# Patient Record
Sex: FEMALE | Race: WHITE | NOT HISPANIC OR LATINO | Employment: UNEMPLOYED | URBAN - METROPOLITAN AREA
[De-identification: names, ages, dates, MRNs, and addresses within clinical notes are randomized per-mention and may not be internally consistent; named-entity substitution may affect disease eponyms.]

---

## 2024-03-26 LAB
C TRACH RRNA SPEC QL PROBE: NEGATIVE
N GONORRHOEAE, AMPLIFIED DNA: NEGATIVE

## 2024-04-10 LAB — ABO + RH BLD: NORMAL

## 2024-04-18 LAB
HBV SURFACE AG SERPL QL IA: NEGATIVE
HCV AB SERPL QL IA: NEGATIVE
HCV AB SERPL QL IA: NORMAL
HIV 1+2 AB+HIV1 P24 AG SERPL QL IA: NEGATIVE
RPR: NON REACTIVE
RUBELLA IMMUNE STATUS: NORMAL

## 2024-08-24 LAB — GLUCOSE SERPL-MCNC: 133 MG/DL

## 2024-11-10 LAB — PRENATAL STREP B CULTURE: POSITIVE

## 2024-11-28 ENCOUNTER — HOSPITAL ENCOUNTER (INPATIENT)
Facility: HOSPITAL | Age: 24
LOS: 2 days | Discharge: HOME | End: 2024-11-30
Attending: SPECIALIST | Admitting: OBSTETRICS & GYNECOLOGY
Payer: MEDICAID

## 2024-11-28 DIAGNOSIS — Z3A.38 38 WEEKS GESTATION OF PREGNANCY: ICD-10-CM

## 2024-11-28 DIAGNOSIS — O42.90 AMNIOTIC FLUID LEAKING: ICD-10-CM

## 2024-11-28 LAB
AMPHET+METHAMPHET UR QL: NEGATIVE
BACTERIA #/AREA URNS HPF: ABNORMAL /HPF
BARBITURATES UR QL SCN>200 NG/ML: NEGATIVE
BASOPHILS # BLD AUTO: 0.02 K/UL (ref 0–0.2)
BASOPHILS NFR BLD: 0.2 % (ref 0–1.9)
BENZODIAZ UR QL SCN>200 NG/ML: NEGATIVE
BILIRUB UR QL STRIP: NEGATIVE
BUPRENORPHINE UR QL: NEGATIVE
BZE UR QL SCN: NEGATIVE
CANNABINOIDS UR QL SCN: NEGATIVE
CLARITY UR: CLEAR
COLOR UR: YELLOW
CREAT UR-MCNC: 25.7 MG/DL (ref 15–325)
DIFFERENTIAL METHOD BLD: ABNORMAL
EOSINOPHIL # BLD AUTO: 0.1 K/UL (ref 0–0.5)
EOSINOPHIL NFR BLD: 0.5 % (ref 0–8)
ERYTHROCYTE [DISTWIDTH] IN BLOOD BY AUTOMATED COUNT: 15.5 % (ref 11.5–14.5)
FENTANYL UR QL SCN: NORMAL
GLUCOSE UR QL STRIP: ABNORMAL
HCT VFR BLD AUTO: 34.4 % (ref 37–48.5)
HGB BLD-MCNC: 11.7 G/DL (ref 12–16)
HGB UR QL STRIP: ABNORMAL
IMM GRANULOCYTES # BLD AUTO: 0.05 K/UL (ref 0–0.04)
IMM GRANULOCYTES NFR BLD AUTO: 0.5 % (ref 0–0.5)
KETONES UR QL STRIP: NEGATIVE
LEUKOCYTE ESTERASE UR QL STRIP: NEGATIVE
LYMPHOCYTES # BLD AUTO: 1.9 K/UL (ref 1–4.8)
LYMPHOCYTES NFR BLD: 18.8 % (ref 18–48)
MCH RBC QN AUTO: 30.8 PG (ref 27–31)
MCHC RBC AUTO-ENTMCNC: 34 G/DL (ref 32–36)
MCV RBC AUTO: 91 FL (ref 82–98)
MICROSCOPIC COMMENT: ABNORMAL
MONOCYTES # BLD AUTO: 1.2 K/UL (ref 0.3–1)
MONOCYTES NFR BLD: 12.1 % (ref 4–15)
NEUTROPHILS # BLD AUTO: 6.8 K/UL (ref 1.8–7.7)
NEUTROPHILS NFR BLD: 67.9 % (ref 38–73)
NITRITE UR QL STRIP: NEGATIVE
NRBC BLD-RTO: 0 /100 WBC
OPIATES UR QL SCN: NEGATIVE
PCP UR QL SCN>25 NG/ML: NEGATIVE
PH UR STRIP: 7 [PH] (ref 5–8)
PLATELET # BLD AUTO: 173 K/UL (ref 150–450)
PMV BLD AUTO: 9.7 FL (ref 9.2–12.9)
PROT UR QL STRIP: NEGATIVE
RBC # BLD AUTO: 3.8 M/UL (ref 4–5.4)
RBC #/AREA URNS HPF: 6 /HPF (ref 0–4)
SP GR UR STRIP: 1.01 (ref 1–1.03)
SQUAMOUS #/AREA URNS HPF: 4 /HPF
TOXICOLOGY INFORMATION: NORMAL
URN SPEC COLLECT METH UR: ABNORMAL
UROBILINOGEN UR STRIP-ACNC: NEGATIVE EU/DL
WBC # BLD AUTO: 9.94 K/UL (ref 3.9–12.7)
WBC #/AREA URNS HPF: 3 /HPF (ref 0–5)

## 2024-11-28 PROCEDURE — 80307 DRUG TEST PRSMV CHEM ANLYZR: CPT | Performed by: OBSTETRICS & GYNECOLOGY

## 2024-11-28 PROCEDURE — 80307 DRUG TEST PRSMV CHEM ANLYZR: CPT | Mod: 91 | Performed by: OBSTETRICS & GYNECOLOGY

## 2024-11-28 PROCEDURE — 81001 URINALYSIS AUTO W/SCOPE: CPT | Performed by: OBSTETRICS & GYNECOLOGY

## 2024-11-28 PROCEDURE — 25000003 PHARM REV CODE 250: Performed by: OBSTETRICS & GYNECOLOGY

## 2024-11-28 PROCEDURE — 86593 SYPHILIS TEST NON-TREP QUANT: CPT | Performed by: OBSTETRICS & GYNECOLOGY

## 2024-11-28 PROCEDURE — 72100002 HC LABOR CARE, 1ST 8 HOURS

## 2024-11-28 PROCEDURE — 12000002 HC ACUTE/MED SURGE SEMI-PRIVATE ROOM

## 2024-11-28 PROCEDURE — 86900 BLOOD TYPING SEROLOGIC ABO: CPT | Performed by: OBSTETRICS & GYNECOLOGY

## 2024-11-28 PROCEDURE — 85025 COMPLETE CBC W/AUTO DIFF WBC: CPT | Performed by: OBSTETRICS & GYNECOLOGY

## 2024-11-28 PROCEDURE — 63600175 PHARM REV CODE 636 W HCPCS: Performed by: OBSTETRICS & GYNECOLOGY

## 2024-11-28 RX ORDER — MISOPROSTOL 200 UG/1
800 TABLET ORAL ONCE AS NEEDED
Status: DISCONTINUED | OUTPATIENT
Start: 2024-11-28 | End: 2024-11-29

## 2024-11-28 RX ORDER — CEFAZOLIN SODIUM 1 G/50ML
1 SOLUTION INTRAVENOUS
Status: DISCONTINUED | OUTPATIENT
Start: 2024-11-29 | End: 2024-11-29

## 2024-11-28 RX ORDER — NALBUPHINE HYDROCHLORIDE 10 MG/ML
5 INJECTION INTRAMUSCULAR; INTRAVENOUS; SUBCUTANEOUS
Status: DISCONTINUED | OUTPATIENT
Start: 2024-11-28 | End: 2024-11-29

## 2024-11-28 RX ORDER — LIDOCAINE HYDROCHLORIDE 10 MG/ML
10 INJECTION, SOLUTION INFILTRATION; PERINEURAL ONCE AS NEEDED
Status: DISCONTINUED | OUTPATIENT
Start: 2024-11-28 | End: 2024-11-29

## 2024-11-28 RX ORDER — FENTANYL/BUPIVACAINE/NS/PF 2MCG/ML-.1
PLASTIC BAG, INJECTION (ML) INJECTION CONTINUOUS
Status: DISCONTINUED | OUTPATIENT
Start: 2024-11-28 | End: 2024-11-29

## 2024-11-28 RX ORDER — CARBOPROST TROMETHAMINE 250 UG/ML
250 INJECTION, SOLUTION INTRAMUSCULAR
Status: DISCONTINUED | OUTPATIENT
Start: 2024-11-28 | End: 2024-11-29

## 2024-11-28 RX ORDER — OXYTOCIN-SODIUM CHLORIDE 0.9% IV SOLN 30 UNIT/500ML 30-0.9/5 UT/ML-%
0-30 SOLUTION INTRAVENOUS CONTINUOUS
Status: DISCONTINUED | OUTPATIENT
Start: 2024-11-28 | End: 2024-11-29

## 2024-11-28 RX ORDER — EPHEDRINE SULFATE 50 MG/ML
10 INJECTION, SOLUTION INTRAVENOUS ONCE AS NEEDED
Status: DISCONTINUED | OUTPATIENT
Start: 2024-11-28 | End: 2024-11-29

## 2024-11-28 RX ORDER — ONDANSETRON HYDROCHLORIDE 2 MG/ML
4 INJECTION, SOLUTION INTRAVENOUS EVERY 6 HOURS PRN
Status: DISCONTINUED | OUTPATIENT
Start: 2024-11-28 | End: 2024-11-29

## 2024-11-28 RX ORDER — OXYTOCIN-SODIUM CHLORIDE 0.9% IV SOLN 30 UNIT/500ML 30-0.9/5 UT/ML-%
10 SOLUTION INTRAVENOUS ONCE AS NEEDED
Status: DISCONTINUED | OUTPATIENT
Start: 2024-11-28 | End: 2024-11-29

## 2024-11-28 RX ORDER — DIPHENOXYLATE HYDROCHLORIDE AND ATROPINE SULFATE 2.5; .025 MG/1; MG/1
2 TABLET ORAL EVERY 6 HOURS PRN
Status: DISCONTINUED | OUTPATIENT
Start: 2024-11-28 | End: 2024-11-29

## 2024-11-28 RX ORDER — METHYLERGONOVINE MALEATE 0.2 MG/ML
200 INJECTION INTRAVENOUS ONCE AS NEEDED
Status: DISCONTINUED | OUTPATIENT
Start: 2024-11-28 | End: 2024-11-29

## 2024-11-28 RX ORDER — OXYTOCIN 10 [USP'U]/ML
10 INJECTION, SOLUTION INTRAMUSCULAR; INTRAVENOUS ONCE AS NEEDED
Status: DISCONTINUED | OUTPATIENT
Start: 2024-11-28 | End: 2024-11-29

## 2024-11-28 RX ORDER — OXYTOCIN-SODIUM CHLORIDE 0.9% IV SOLN 30 UNIT/500ML 30-0.9/5 UT/ML-%
95 SOLUTION INTRAVENOUS ONCE AS NEEDED
Status: DISCONTINUED | OUTPATIENT
Start: 2024-11-28 | End: 2024-11-29

## 2024-11-28 RX ORDER — CEFAZOLIN SODIUM 2 G/50ML
2 SOLUTION INTRAVENOUS ONCE
Status: COMPLETED | OUTPATIENT
Start: 2024-11-28 | End: 2024-11-28

## 2024-11-28 RX ORDER — SODIUM CHLORIDE, SODIUM LACTATE, POTASSIUM CHLORIDE, CALCIUM CHLORIDE 600; 310; 30; 20 MG/100ML; MG/100ML; MG/100ML; MG/100ML
INJECTION, SOLUTION INTRAVENOUS CONTINUOUS
Status: DISCONTINUED | OUTPATIENT
Start: 2024-11-28 | End: 2024-11-29

## 2024-11-28 RX ORDER — TRANEXAMIC ACID 10 MG/ML
1000 INJECTION, SOLUTION INTRAVENOUS EVERY 30 MIN PRN
Status: DISCONTINUED | OUTPATIENT
Start: 2024-11-28 | End: 2024-11-29

## 2024-11-28 RX ORDER — CALCIUM CARBONATE 200(500)MG
500 TABLET,CHEWABLE ORAL 3 TIMES DAILY PRN
Status: DISCONTINUED | OUTPATIENT
Start: 2024-11-28 | End: 2024-11-29

## 2024-11-28 RX ORDER — OXYTOCIN-SODIUM CHLORIDE 0.9% IV SOLN 30 UNIT/500ML 30-0.9/5 UT/ML-%
95 SOLUTION INTRAVENOUS CONTINUOUS PRN
Status: DISCONTINUED | OUTPATIENT
Start: 2024-11-28 | End: 2024-11-29

## 2024-11-28 RX ORDER — NALBUPHINE HYDROCHLORIDE 10 MG/ML
10 INJECTION INTRAMUSCULAR; INTRAVENOUS; SUBCUTANEOUS
Status: DISCONTINUED | OUTPATIENT
Start: 2024-11-28 | End: 2024-11-29

## 2024-11-28 RX ORDER — ROPIVACAINE HYDROCHLORIDE 2 MG/ML
20 INJECTION, SOLUTION EPIDURAL; INFILTRATION ONCE AS NEEDED
Status: COMPLETED | OUTPATIENT
Start: 2024-11-28 | End: 2024-11-29

## 2024-11-28 RX ADMIN — CEFAZOLIN SODIUM 2 G: 2 SOLUTION INTRAVENOUS at 10:11

## 2024-11-28 RX ADMIN — SODIUM CHLORIDE, POTASSIUM CHLORIDE, SODIUM LACTATE AND CALCIUM CHLORIDE: 600; 310; 30; 20 INJECTION, SOLUTION INTRAVENOUS at 10:11

## 2024-11-28 RX ADMIN — OXYTOCIN 2 MILLI-UNITS/MIN: 10 INJECTION INTRAVENOUS at 11:11

## 2024-11-29 ENCOUNTER — ANESTHESIA EVENT (OUTPATIENT)
Dept: OBSTETRICS AND GYNECOLOGY | Facility: HOSPITAL | Age: 24
End: 2024-11-29
Payer: MEDICAID

## 2024-11-29 ENCOUNTER — ANESTHESIA (OUTPATIENT)
Dept: OBSTETRICS AND GYNECOLOGY | Facility: HOSPITAL | Age: 24
End: 2024-11-29
Payer: COMMERCIAL

## 2024-11-29 PROBLEM — Z3A.38 38 WEEKS GESTATION OF PREGNANCY: Status: ACTIVE | Noted: 2024-11-29

## 2024-11-29 PROBLEM — O42.90 AMNIOTIC FLUID LEAKING: Status: ACTIVE | Noted: 2024-11-29

## 2024-11-29 LAB
ABO + RH BLD: NORMAL
BLD GP AB SCN CELLS X3 SERPL QL: NORMAL
TREPONEMA PALLIDUM IGG+IGM AB [PRESENCE] IN SERUM OR PLASMA BY IMMUNOASSAY: NONREACTIVE

## 2024-11-29 PROCEDURE — 27200710 HC EPIDURAL INFUSION PUMP SET: Performed by: ANESTHESIOLOGY

## 2024-11-29 PROCEDURE — 62326 NJX INTERLAMINAR LMBR/SAC: CPT | Performed by: ANESTHESIOLOGY

## 2024-11-29 PROCEDURE — 99900031 HC PATIENT EDUCATION (STAT)

## 2024-11-29 PROCEDURE — 25000003 PHARM REV CODE 250: Performed by: ANESTHESIOLOGY

## 2024-11-29 PROCEDURE — 94799 UNLISTED PULMONARY SVC/PX: CPT

## 2024-11-29 PROCEDURE — 63600175 PHARM REV CODE 636 W HCPCS: Performed by: ANESTHESIOLOGY

## 2024-11-29 PROCEDURE — 12000002 HC ACUTE/MED SURGE SEMI-PRIVATE ROOM

## 2024-11-29 PROCEDURE — C1751 CATH, INF, PER/CENT/MIDLINE: HCPCS | Performed by: ANESTHESIOLOGY

## 2024-11-29 PROCEDURE — 36415 COLL VENOUS BLD VENIPUNCTURE: CPT | Performed by: OBSTETRICS & GYNECOLOGY

## 2024-11-29 PROCEDURE — 99900035 HC TECH TIME PER 15 MIN (STAT)

## 2024-11-29 PROCEDURE — 51702 INSERT TEMP BLADDER CATH: CPT

## 2024-11-29 PROCEDURE — 25000003 PHARM REV CODE 250: Performed by: OBSTETRICS & GYNECOLOGY

## 2024-11-29 PROCEDURE — 59409 OBSTETRICAL CARE: CPT | Mod: AA,,, | Performed by: ANESTHESIOLOGY

## 2024-11-29 PROCEDURE — 25000242 PHARM REV CODE 250 ALT 637 W/ HCPCS: Performed by: OBSTETRICS & GYNECOLOGY

## 2024-11-29 PROCEDURE — 72200005 HC VAGINAL DELIVERY LEVEL II

## 2024-11-29 PROCEDURE — 94761 N-INVAS EAR/PLS OXIMETRY MLT: CPT

## 2024-11-29 RX ORDER — DIPHENHYDRAMINE HCL 25 MG
25 CAPSULE ORAL EVERY 4 HOURS PRN
Status: DISCONTINUED | OUTPATIENT
Start: 2024-11-29 | End: 2024-11-30 | Stop reason: HOSPADM

## 2024-11-29 RX ORDER — ONDANSETRON HYDROCHLORIDE 2 MG/ML
4 INJECTION, SOLUTION INTRAVENOUS EVERY 6 HOURS PRN
Status: DISCONTINUED | OUTPATIENT
Start: 2024-11-29 | End: 2024-11-29

## 2024-11-29 RX ORDER — OXYTOCIN-SODIUM CHLORIDE 0.9% IV SOLN 30 UNIT/500ML 30-0.9/5 UT/ML-%
95 SOLUTION INTRAVENOUS ONCE AS NEEDED
Status: DISCONTINUED | OUTPATIENT
Start: 2024-11-29 | End: 2024-11-30 | Stop reason: HOSPADM

## 2024-11-29 RX ORDER — OXYTOCIN-SODIUM CHLORIDE 0.9% IV SOLN 30 UNIT/500ML 30-0.9/5 UT/ML-%
30 SOLUTION INTRAVENOUS ONCE AS NEEDED
Status: DISCONTINUED | OUTPATIENT
Start: 2024-11-29 | End: 2024-11-30 | Stop reason: HOSPADM

## 2024-11-29 RX ORDER — OXYTOCIN 10 [USP'U]/ML
10 INJECTION, SOLUTION INTRAMUSCULAR; INTRAVENOUS ONCE AS NEEDED
Status: DISCONTINUED | OUTPATIENT
Start: 2024-11-29 | End: 2024-11-30 | Stop reason: HOSPADM

## 2024-11-29 RX ORDER — SODIUM CHLORIDE 0.9 % (FLUSH) 0.9 %
10 SYRINGE (ML) INJECTION
Status: DISCONTINUED | OUTPATIENT
Start: 2024-11-29 | End: 2024-11-30 | Stop reason: HOSPADM

## 2024-11-29 RX ORDER — NALOXONE HCL 0.4 MG/ML
0.4 VIAL (ML) INJECTION SEE ADMIN INSTRUCTIONS
Status: DISCONTINUED | OUTPATIENT
Start: 2024-11-29 | End: 2024-11-29

## 2024-11-29 RX ORDER — FENTANYL/BUPIVACAINE/NS/PF 2MCG/ML-.1
14 PLASTIC BAG, INJECTION (ML) INJECTION CONTINUOUS
Status: DISCONTINUED | OUTPATIENT
Start: 2024-11-29 | End: 2024-11-29

## 2024-11-29 RX ORDER — HYDROCODONE BITARTRATE AND ACETAMINOPHEN 10; 325 MG/1; MG/1
1 TABLET ORAL EVERY 4 HOURS PRN
Status: DISCONTINUED | OUTPATIENT
Start: 2024-11-29 | End: 2024-11-30 | Stop reason: HOSPADM

## 2024-11-29 RX ORDER — METHYLERGONOVINE MALEATE 0.2 MG/ML
200 INJECTION INTRAVENOUS ONCE AS NEEDED
Status: DISCONTINUED | OUTPATIENT
Start: 2024-11-29 | End: 2024-11-30 | Stop reason: HOSPADM

## 2024-11-29 RX ORDER — TRANEXAMIC ACID 10 MG/ML
1000 INJECTION, SOLUTION INTRAVENOUS EVERY 30 MIN PRN
Status: DISCONTINUED | OUTPATIENT
Start: 2024-11-29 | End: 2024-11-30 | Stop reason: HOSPADM

## 2024-11-29 RX ORDER — DIPHENOXYLATE HYDROCHLORIDE AND ATROPINE SULFATE 2.5; .025 MG/1; MG/1
2 TABLET ORAL EVERY 6 HOURS PRN
Status: DISCONTINUED | OUTPATIENT
Start: 2024-11-29 | End: 2024-11-30 | Stop reason: HOSPADM

## 2024-11-29 RX ORDER — EPHEDRINE SULFATE 50 MG/ML
10 INJECTION, SOLUTION INTRAVENOUS ONCE AS NEEDED
Status: DISCONTINUED | OUTPATIENT
Start: 2024-11-29 | End: 2024-11-29

## 2024-11-29 RX ORDER — HYDROCODONE BITARTRATE AND ACETAMINOPHEN 5; 325 MG/1; MG/1
1 TABLET ORAL EVERY 4 HOURS PRN
Status: DISCONTINUED | OUTPATIENT
Start: 2024-11-29 | End: 2024-11-30 | Stop reason: HOSPADM

## 2024-11-29 RX ORDER — CARBOPROST TROMETHAMINE 250 UG/ML
250 INJECTION, SOLUTION INTRAMUSCULAR
Status: DISCONTINUED | OUTPATIENT
Start: 2024-11-29 | End: 2024-11-30 | Stop reason: HOSPADM

## 2024-11-29 RX ORDER — ALBUTEROL SULFATE 90 UG/1
1 INHALANT RESPIRATORY (INHALATION) EVERY 6 HOURS PRN
Status: DISCONTINUED | OUTPATIENT
Start: 2024-11-29 | End: 2024-11-29

## 2024-11-29 RX ORDER — DOCUSATE SODIUM 100 MG/1
200 CAPSULE, LIQUID FILLED ORAL 2 TIMES DAILY PRN
Status: DISCONTINUED | OUTPATIENT
Start: 2024-11-29 | End: 2024-11-30 | Stop reason: HOSPADM

## 2024-11-29 RX ORDER — OXYTOCIN-SODIUM CHLORIDE 0.9% IV SOLN 30 UNIT/500ML 30-0.9/5 UT/ML-%
95 SOLUTION INTRAVENOUS CONTINUOUS PRN
Status: DISCONTINUED | OUTPATIENT
Start: 2024-11-29 | End: 2024-11-30 | Stop reason: HOSPADM

## 2024-11-29 RX ORDER — MISOPROSTOL 200 UG/1
800 TABLET ORAL ONCE AS NEEDED
Status: DISCONTINUED | OUTPATIENT
Start: 2024-11-29 | End: 2024-11-30 | Stop reason: HOSPADM

## 2024-11-29 RX ORDER — ONDANSETRON 4 MG/1
8 TABLET, ORALLY DISINTEGRATING ORAL EVERY 8 HOURS PRN
Status: DISCONTINUED | OUTPATIENT
Start: 2024-11-29 | End: 2024-11-30 | Stop reason: HOSPADM

## 2024-11-29 RX ORDER — DIPHENHYDRAMINE HYDROCHLORIDE 50 MG/ML
12.5 INJECTION INTRAMUSCULAR; INTRAVENOUS EVERY 4 HOURS PRN
Status: DISCONTINUED | OUTPATIENT
Start: 2024-11-29 | End: 2024-11-29

## 2024-11-29 RX ORDER — ACETAMINOPHEN 325 MG/1
650 TABLET ORAL EVERY 6 HOURS PRN
Status: DISCONTINUED | OUTPATIENT
Start: 2024-11-29 | End: 2024-11-30 | Stop reason: HOSPADM

## 2024-11-29 RX ORDER — DIPHENHYDRAMINE HYDROCHLORIDE 50 MG/ML
25 INJECTION INTRAMUSCULAR; INTRAVENOUS EVERY 4 HOURS PRN
Status: DISCONTINUED | OUTPATIENT
Start: 2024-11-29 | End: 2024-11-30 | Stop reason: HOSPADM

## 2024-11-29 RX ORDER — AZELASTINE 1 MG/ML
1 SPRAY, METERED NASAL 2 TIMES DAILY PRN
Status: DISCONTINUED | OUTPATIENT
Start: 2024-11-29 | End: 2024-11-30 | Stop reason: HOSPADM

## 2024-11-29 RX ORDER — IBUPROFEN 400 MG/1
800 TABLET ORAL EVERY 6 HOURS PRN
Status: DISCONTINUED | OUTPATIENT
Start: 2024-11-29 | End: 2024-11-30 | Stop reason: HOSPADM

## 2024-11-29 RX ORDER — HYDROCORTISONE 25 MG/G
CREAM TOPICAL 3 TIMES DAILY PRN
Status: DISCONTINUED | OUTPATIENT
Start: 2024-11-29 | End: 2024-11-30 | Stop reason: HOSPADM

## 2024-11-29 RX ORDER — ROPIVACAINE HYDROCHLORIDE 2 MG/ML
INJECTION, SOLUTION EPIDURAL; INFILTRATION
Status: DISCONTINUED | OUTPATIENT
Start: 2024-11-29 | End: 2024-11-29

## 2024-11-29 RX ORDER — ALBUTEROL SULFATE 0.83 MG/ML
2.5 SOLUTION RESPIRATORY (INHALATION) EVERY 6 HOURS PRN
Status: DISCONTINUED | OUTPATIENT
Start: 2024-11-29 | End: 2024-11-30 | Stop reason: HOSPADM

## 2024-11-29 RX ADMIN — ROPIVACAINE HYDROCHLORIDE 4 ML: 2 INJECTION, SOLUTION EPIDURAL; INFILTRATION at 01:11

## 2024-11-29 RX ADMIN — HYDROCODONE BITARTRATE AND ACETAMINOPHEN 1 TABLET: 5; 325 TABLET ORAL at 11:11

## 2024-11-29 RX ADMIN — ROPIVACAINE HYDROCHLORIDE 20 ML: 2 INJECTION EPIDURAL; INFILTRATION; PERINEURAL at 01:11

## 2024-11-29 RX ADMIN — BENZOCAINE AND LEVOMENTHOL: 200; 5 SPRAY TOPICAL at 03:11

## 2024-11-29 RX ADMIN — Medication: at 11:11

## 2024-11-29 RX ADMIN — IBUPROFEN 800 MG: 400 TABLET, FILM COATED ORAL at 06:11

## 2024-11-29 RX ADMIN — Medication 14 ML/HR: at 01:11

## 2024-11-29 RX ADMIN — HYDROCODONE BITARTRATE AND ACETAMINOPHEN 1 TABLET: 5; 325 TABLET ORAL at 03:11

## 2024-11-29 RX ADMIN — IBUPROFEN 800 MG: 400 TABLET, FILM COATED ORAL at 10:11

## 2024-11-29 NOTE — PLAN OF CARE
Problem: Labor  Goal: Hemostasis  Outcome: Met  Goal: Stable Fetal Wellbeing  Outcome: Met  Goal: Effective Progression to Delivery  Outcome: Met  Goal: Absence of Infection Signs and Symptoms  Outcome: Met  Goal: Acceptable Pain Control  Outcome: Met  Goal: Normal Uterine Contraction Pattern  Outcome: Met     Pt delivered viable infant male vaginally.

## 2024-11-29 NOTE — LACTATION NOTE
This note was copied from a baby's chart.  Mom reports baby is breastfeeding well. Mom denies any questions/concerns or assistance @ this time. Assistance offered prn. Mom verbalized understanding

## 2024-11-29 NOTE — NURSING TRANSFER
Nursing Transfer Note      11/29/2024   12:57 PM    Nurse giving handoff: ROGER zhu  Nurse receiving handoff:Zeynep curiel     Reason patient is being transferred: post partum    Transfer To: 2100    Transfer via wheelchair    Transfer with pt belongings    Transported by ROGER Zhu     Transfer Vital Signs:  Blood Pressure:pending pp nurse charting   Heart Rate:-  O2:-  Temperature:-  Respirations:-    Telemetry: na   Order for Tele Monitor? No    Additional Lines: none     Medicines sent: none    Any special needs or follow-up needed: fundal checks     Patient belongings transferred with patient: Yes    Chart send with patient: Yes    Notified: spouse    Patient reassessed at: 1315 11/29/24 (date, time)  1  Upon arrival to floor: patient oriented to room, call bell in reach, and bed in lowest position

## 2024-11-29 NOTE — NURSING
[facility]  Department of Obstetrics and Gynecology  Labor & Delivery Triage Assessment    PATIENT NAME: Julianna Solomon  MRN: [id number]  TODAY'S DATE: 2024    CHIEF COMPLAINT: No chief complaint on file.      OB History    Para Term  AB Living   3 2 2 0 0 2   SAB IAB Ectopic Multiple Live Births   0 0 0 0 2      # Outcome Date GA Lbr Karlos/2nd Weight Sex Type Anes PTL Lv   3 Current            2 Term 19 40w2d   M Vag-Spont EPI  MAHESH   1 Term 12/27/15 39w0d  3.572 kg (7 lb 14 oz) F Vag-Spont EPI N MAHESH      Name: chandler     Past Medical History:   Diagnosis Date    Asthma      History reviewed. No pertinent surgical history.      VITAL SIGNS - ABNORMAL VITALS INCLUDE TEMP >100.4,RR <12 or >26, SUSTAINED MATERNAL PULSE <60 or >120     VITAL SIGNS (Most Recent)  Temp: 98.2 °F (36.8 °C) (24)  Pulse: (!) 113 (24)  Resp: 16 (24)  BP: 117/74 (24)  SpO2: 99 % (24)    VITAL SIGNS     normal  HEADACHE    no     VOMITING    no  VISUAL DISTURBANCES  no  EPIGASTRIC PAIN        no  PROTEINURIA 2+ or MORE             no   EDEMA FACE/EXTREMITIES            no    FETAL MOVEMENT     FETAL MOVEMENT: Active  FETAL HEART RATE BASELINE =  140  normal  FETAL HEART RATE VARIABILITY:  Moderate  FETAL HEART RATE ACCELERATIONS FOR GESTATIONAL AGE: present  FETAL HEART RATE DECELERATIONS: none    ABDOMINAL PAIN/CRAMPING/CONTRACTIONS     Patient is complaining of abdominal pain/cramping/contractions. Contraction pattern is regular, < or = 5 minutes apart. Contraction strength is mild. Resting tone is relaxed. Abdominal palpation is non-tender.    RUPTURE OF MEMBRANES OR LEAKING OF AMNIOTIC FLUID     Patient reports leaking of amniotic fluid and is clear in color and reporting amount as moderate.   ROM plus collected is Positive. GBS status is Positive.    VAGINAL BLEEDING     Patient denies vaginal bleeding.    VAGINAL EXAM     DILATION:  3  STATION:   -3  EFFACEMENT:  70      PAIN PRESENT ON ARRIVAL     ONSET:   Intermittent  LOCATION:  Abdomen  PAIN SCALE (0-10):  1  DESCRIPTION: tightening    Interventions     None.      PATIENT DISPOSITION     Admitted to Labor & Delivery      Dr. Flores notified at 2131 of the above assessment. Dr. Gannon to follow    Sil Joe, ANNALISA  [facility]  11/28/2024

## 2024-11-29 NOTE — PROGRESS NOTES
Automatic Inhaler to Nebulizer Interchange    albuterol (Ventolin, ProAir, or Proventil)  mcg given multiple times per day changed to albuterol 2.5 mg q6h prn wheezing or shortness of breath  per [facility] Automatic Therapeutic Substitutions Protocol.    Please contact pharmacy at extension [phone] with any questions.     Thank you,   Daniel Duenas

## 2024-11-29 NOTE — L&D DELIVERY NOTE
[facility]  Vaginal Delivery   Operative Note    SUMMARY     Normal spontaneous vaginal delivery of live infant, was placed on mothers abdomen for skin to skin and bulb suctioning performed.  Infant delivered position OA over intact perineum.  Nuchal cord: Yes, cord reduced at perineum.x2    Spontaneous delivery of placenta and IV pitocin given noting good uterine tone.  No lacerations noted.  Patient tolerated delivery well. Sponge needle and lap counted correctly x2.  EBL=100cc    Indications: Amniotic fluid leaking  Pregnancy complicated by:   Patient Active Problem List   Diagnosis    Amniotic fluid leaking    38 weeks gestation of pregnancy     Admitting GA: 38w5d    Delivery Information for Robert Solomon    Birth information:  YOB: 2025   Time of birth: 5:45 AM   Sex: male   Head Delivery Date/Time:     Delivery type:    Gestational Age: 38w5d       Delivery Providers    Delivering clinician:            Measurements    Weight:   Length:          Apgars    Living status:   Apgar Component Scores:  1 min.:  5 min.:  10 min.:  15 min.:  20 min.:    Skin color:         Heart rate:         Reflex irritability:         Muscle tone:         Respiratory effort:         Total:                                  Interventions/Resuscitation           Cord    No data filed       Placenta    Placenta delivery date/time:   Placenta removal:            Labor Events:       labor:       Labor Onset Date/Time:         Dilation Complete Date/Time:         Start Pushing Date/Time:         Start Pushing Date/Time:       Rupture Date/Time: 24        Rupture type: SRM (Spontaneous Rupture)        Fluid Amount:       Fluid Color: Clear              steroids:       Antibiotics given for GBS:       Induction:       Indications for induction:        Augmentation:       Indications for augmentation:       Labor complications:       Additional complications:          Cervical  ripening:                     Delivery:      Episiotomy:       Indication for Episiotomy:       Perineal Lacerations:   Repaired:      Periurethral Laceration:   Repaired:     Labial Laceration:   Repaired:     Sulcus Laceration:   Repaired:     Vaginal Laceration:   Repaired:     Cervical Laceration:   Repaired:     Repair suture:       Repair # of packets:       Last Value - EBL - Nursing (mL):       Sum - EBL - Nursing (mL): 0     Last Value - EBL - Anesthesia (mL):      Calculated QBL (mL):       Running total QBL (mL):       Vaginal Sweep Performed:       Surgicount Correct:         Other providers:            Details (if applicable):  Trial of Labor      Categorization:      Priority:     Indications for :     Incision Type:       Additional  information:  Forceps:    Vacuum:    Breech:    Observed anomalies    Other (Comments):

## 2024-11-29 NOTE — NURSING
Ongoing assessment, acceptable pain management, Continuous EFM and tocometry. Vaginal bleeding wnl, anticipate . Reassess pt needs prn

## 2024-11-29 NOTE — NURSING
[facility]  Department of OBGYN  OB Summary        PATIENT NAME: Julianna Solomon                                                                                                                                                                       AGE: 24 y.o.                                                                                          MRN: [id number]  PATIENT LOCATION:  [facility]  ADMIT DATE: 2024  TODAY'S DATE: 2024 Hospital Day: 2  MARIO: Estimated Date of Delivery: 24  GA: 38w5d     OB HISTORY:    OB History    Para Term  AB Living   3 3 3     3   SAB IAB Ectopic Multiple Live Births         0 3      # Outcome Date GA Lbr Karlos/2nd Weight Sex Type Anes PTL Lv   3 Term 24 38w5d 03:39 / 00:08  M Vag-Spont EPI N MAHESH   2 Term 19 40w2d   M Vag-Spont EPI  MAHESH   1 Term 12/27/15 39w0d  3.572 kg (7 lb 14 oz) F Vag-Spont EPI N MAHESH       PRE-PROCEDURE DIAGNOSIS: Amniotic fluid leaking    PROCEDURE:   * No surgery found *       MATERNAL LABS   Lab Results   Component Value Date    GROUPTRH A POS 2024    HGB 11.7 (L) 2024    HCT 34.4 (L) 2024     2024    RUBELLAIMMUN Immune 2024    HEPBSAG Negative 2024    FJH82THDT Negative 2024    RPR Non Reactive 2024    OBGLUCOSESCR 133 2024    STREPBCULT Positive 11/10/2024       Fentanyl, Urine   Date Value Ref Range Status   2024 Negative @FAMILIA Negative Final     Comment:     The cut-off value is 5.0 ng/mL. This is a screening test. If results   do not   correlate with clinical presentation then a confirmatory send out   test is   advised. This result is intended for use in clinical management. It   is not   intended for use in employment related testing.         Benzodiazepines   Date Value Ref Range Status   2024 Negative Negative Final     Cocaine (Metab.)   Date Value Ref Range Status   2024 Negative Negative Final     Opiate Scrn, Ur    Date Value Ref Range Status   11/28/2024 Negative Negative Final     Barbiturate Screen, Ur   Date Value Ref Range Status   11/28/2024 Negative Negative Final     Amphetamine Screen, Ur   Date Value Ref Range Status   11/28/2024 Negative Negative Final     THC   Date Value Ref Range Status   11/28/2024 Negative Negative Final     Phencyclidine   Date Value Ref Range Status   11/28/2024 Negative Negative Final     BUPRENORPHINE   Date Value Ref Range Status   11/28/2024 Negative  Final     Comment:     Buprenorphine urine screening threshold: 5 ng/mL.    This report is intended for use in clinical monitoring and management   of   patients only.          SPECIMENS  Specimen (24h ago, onward)      None             Antibiotics (From admission, onward)      None            INPATIENT MEDICATIONS  Current Facility-Administered Medications   Medication Dose Route Frequency Provider Last Rate Last Admin    acetaminophen tablet 650 mg  650 mg Oral Q6H PRJodie Mayer MD        benzocaine-lanolin (DERMOPLAST) topical spray   Topical (Top) Continuous PRJodie Mayer MD        carboprost injection 250 mcg  250 mcg Intramuscular Q15 Min Jodie Stover MD        diphenhydrAMINE capsule 25 mg  25 mg Oral Q4H PRJodie Mayer MD        diphenhydrAMINE injection 25 mg  25 mg Intravenous Q4H Jodie Stover MD        diphenoxylate-atropine 2.5-0.025 mg per tablet 2 tablet  2 tablet Oral Q6H PRJodie Mayer MD        docusate sodium capsule 200 mg  200 mg Oral BID PRJodie Mayer MD        HYDROcodone-acetaminophen  mg per tablet 1 tablet  1 tablet Oral Q4H Jodie Stover MD        HYDROcodone-acetaminophen 5-325 mg per tablet 1 tablet  1 tablet Oral Q4H Jodei Stover MD        hydrocortisone 2.5 % rectal cream   Rectal TID Jodie Stover MD        ibuprofen tablet 800 mg  800 mg Oral Q6H PRJodie Mayer MD        lanolin cream   Topical (Top) Jodie Stover MD         measles, mumps and rubella vaccine 1,000-12,500 TCID50/0.5 mL injection 0.5 mL  0.5 mL Subcutaneous vaccine x 1 dose Jodie Gannon MD        methylergonovine injection 200 mcg  200 mcg Intramuscular Once PRN Jodie Gannon MD        miSOPROStoL tablet 800 mcg  800 mcg Rectal Once PRN Jodie Gannon MD        miSOPROStoL tablet 800 mcg  800 mcg Oral Once PRN Jodie Gannon MD        ondansetron disintegrating tablet 8 mg  8 mg Oral Q8H PRN Jodie Gannon MD        oxytocin 30 units/500 mL (60 milliunits/mL) in 0.9% NaCl (non-titrating)  95 albaro-units/min Intravenous Continuous PRN Jodie Gannon MD        oxytocin 30 units/500 mL (60 milliunits/mL) in 0.9% NaCl (non-titrating)  95 albaro-units/min Intravenous Once PRN Jodie Gannon MD        oxytocin 30 units/500 mL (60 milliunits/mL) in 0.9% NaCl IV bolus from bag  30 Units Intravenous Once PRN Jodie Gannon MD        oxytocin injection 10 Units  10 Units Intramuscular Once PRN Jodie Gannon MD        sodium chloride 0.9% flush 10 mL  10 mL Intravenous PRN Jodie Gannon MD        Tdap vaccine injection 0.5 mL  0.5 mL Intramuscular vaccine x 1 dose Jodie Gannon MD        tranexamic acid in NaCl,iso-os IVPB 1,000 mg  1,000 mg Intravenous Q30 Min PRN Jodie Gannon MD           OUTPATIENT MEDICATIONS  Current Outpatient Medications   Medication Instructions    albuterol (PROVENTIL/VENTOLIN HFA) 90 mcg/actuation inhaler 2 puffs, Inhalation, Every 4 hours PRN, Rescue    albuterol (PROVENTIL/VENTOLIN HFA) 90 mcg/actuation inhaler 1-2 puffs, Inhalation, Every 6 hours PRN, Rescue    azelastine (ASTELIN) 137 mcg, Nasal, 2 times daily PRN    benzocaine-menthoL (CHLORASEPTIC MAX) 15-10 mg Lozg 1 lozenge, Mucous Membrane, Every 2 hours PRN    clonazePAM (KLONOPIN) 0.5 mg, Oral, 2 times daily PRN    EPINEPHrine (EPIPEN) 0.3 mg, Intramuscular    fexofenadine (ALLEGRA) 180 mg, Oral, Daily    fluticasone furoate-vilanteroL (BREO ELLIPTA) 200-25  mcg/dose DsDv diskus inhaler 1 puff, Inhalation, Daily, Controller    phenazopyridine (PYRIDIUM) 200 mg, Oral, 3 times daily PRN    pyrilamine-chlophedianoL (NINJACOF) 12.5-12.5 mg/5 mL Liqd 5 mLs, Oral, Every 6-8 hours PRN       VITAL SIGNS (Most Recent)  Temp: 98.2 °F (36.8 °C) (24)  Pulse: 73 (24)  Resp: 16 (24)  BP: (!) 96/59 (24)  SpO2: 98 % (24)  Temp (36hrs), Av.2 °F (36.8 °C), Min:98.1 °F (36.7 °C), Max:98.3 °F (36.8 °C)      Delivery Information for Robert Solomon    Birth information:  YOB: 2025   Time of birth: 5:45 AM   Sex: male   Head Delivery Date/Time: 2024  5:45 AM   Delivery type: Vaginal, Spontaneous   Gestational Age: 38w5d       Delivery Providers    Delivering clinician: Jodie Gannon MD   Provider Role    Sil Joe RN Registered Nurse    Jasen Leiva RN Registered Nurse    Demetria Ward RN Registered Nurse    Gilbert Martin [facility] Person              Measurements    Weight:   Length:          Apgars    Living status: Living  Apgar Component Scores:  1 min.:  5 min.:  10 min.:  15 min.:  20 min.:    Skin color:  1  1       Heart rate:  2  2       Reflex irritability:  2  2       Muscle tone:  2  2       Respiratory effort:  2  2       Total:  9  9       Apgars assigned by: DEMETRIA WARD         Operative Delivery    Forceps attempted?: No  Vacuum extractor attempted?: No         Shoulder Dystocia    Shoulder dystocia present?: No           Presentation    Presentation: Vertex  Position: Left Occiput Anterior           Interventions/Resuscitation    Method: Bulb Suctioning, Tactile Stimulation       Cord    Vessels: 3 vessels  Complications: Nuchal  Nuchal Intervention: reduced  Nuchal Cord Description: loose nuchal cord  Number of Loops: 2  Delayed Cord Clamping?: Yes  Cord Clamped Date/Time: 2024  5:47 AM  Cord Blood Disposition: Sent with Baby  Gases Sent?: No  Stem Cell Collection (by MD):  No       Placenta    Placenta delivery date/time: 2024 0551  Placenta removal: Spontaneous  Placenta appearance: Intact  Placenta disposition: Discarded           Labor Events:       labor: No     Labor Onset Date/Time: 2024 01:58     Dilation Complete Date/Time: 2024 05:37     Start Pushing Date/Time: 2024 05:37     Rupture Date/Time: 24        Rupture type: SRM (Spontaneous Rupture)        Fluid Amount:       Fluid Color: Clear      steroids: None     Antibiotics given for GBS: Yes     Induction: none     Indications for induction:        Augmentation: oxytocin     Indications for augmentation: Ineffective Contraction Pattern     Labor complications: None     Additional complications:          Cervical ripening:                     Delivery:      Episiotomy: None     Indication for Episiotomy:       Perineal Lacerations: None Repaired:      Periurethral Laceration:   Repaired:     Labial Laceration:   Repaired:     Sulcus Laceration:   Repaired:     Vaginal Laceration:   Repaired:     Cervical Laceration:   Repaired:     Repair suture: None     Repair # of packets:       Last Value - EBL - Nursing (mL):       Sum - EBL - Nursing (mL): 0     Last Value - EBL - Anesthesia (mL):      Calculated QBL (mL): 15     Running total QBL (mL): 15     Vaginal Sweep Performed: Yes     Surgicount Correct: Yes       Other providers:       Anesthesia    Method: Epidural          Details (if applicable):  Trial of Labor      Categorization:      Priority:     Indications for :     Incision Type:       Additional  information:  Forceps:    Vacuum:    Breech:    Observed anomalies    Other (Comments):           Time ruptured: 9h 22m    SKIN TO SKIN                INFANT FEEDING       PAST MEDICAL HISTORY   Past Medical History:   Diagnosis Date    Asthma         PAST SURGICAL HISTORY    History reviewed. No pertinent surgical history.     SOCIAL  HISTORY    Social History     Tobacco Use    Smoking status: Former     Types: Cigarettes     Passive exposure: Past    Smokeless tobacco: Never   Substance Use Topics    Alcohol use: Not Currently    Drug use: Never                     Sil Joe RN  Date of Service: 11/29/2024  6:52 AM

## 2024-11-29 NOTE — NURSING
[facility]  Department of Obstetrics and Gynecology  PATIENT NAME: Julianna Solomon  MRN: [id number]  TODAY'S DATE: 2024    CHIEF COMPLAINT: No chief complaint on file.      OB History    Para Term  AB Living   3 2 2 0 0 2   SAB IAB Ectopic Multiple Live Births   0 0 0 0 2      # Outcome Date GA Lbr Karlos/2nd Weight Sex Type Anes PTL Lv   3 Current            2 Term 19 40w2d   M Vag-Spont EPI  MAHESH   1 Term 12/27/15 39w0d  3.572 kg (7 lb 14 oz) F Vag-Spont EPI N MAHESH      Name: chandler     Past Medical History:   Diagnosis Date    Asthma      History reviewed. No pertinent surgical history.  Social History     Tobacco Use    Smoking status: Former     Types: Cigarettes     Passive exposure: Past    Smokeless tobacco: Never   Substance Use Topics    Alcohol use: Not Currently    Drug use: Never       Prenatal Labs  Lab Results   Component Value Date    GROUPTRH A POS 04/10/2024    HGB 11.7 (L) 2024    HCT 34.4 (L) 2024     2024    RUBELLAIMMUN Immune 2024    HEPBSAG Negative 2024    [id number] Negative 2024    RPR Non Reactive 2024    OBGLUCOSESCR 133 2024    STREPBCULT Positive 11/10/2024       VITAL SIGNS - ABNORMAL VITALS INCLUDE TEMP >100.4,RR <12 or >26, SUSTAINED MATERNAL PULSE <60 or >120     VITAL SIGNS (Most Recent)  Temp: 98.1 °F (36.7 °C) (24)  Pulse: 96 (24)  Resp: 16 (24)  BP: 102/67 (24)  SpO2: 98 % (24)    OUTPATIENT MEDICATIONS  Current Outpatient Medications   Medication Instructions    albuterol (PROVENTIL/VENTOLIN HFA) 90 mcg/actuation inhaler 2 puffs, Inhalation, Every 4 hours PRN, Rescue    albuterol (PROVENTIL/VENTOLIN HFA) 90 mcg/actuation inhaler 1-2 puffs, Inhalation, Every 6 hours PRN, Rescue    azelastine (ASTELIN) 137 mcg, Nasal, 2 times daily PRN    benzocaine-menthoL (CHLORASEPTIC MAX) 15-10 mg Lozg 1 lozenge, Mucous Membrane, Every 2 hours PRN     clonazePAM (KLONOPIN) 0.5 mg, Oral, 2 times daily PRN    EPINEPHrine (EPIPEN) 0.3 mg, Intramuscular    fexofenadine (ALLEGRA) 180 mg, Oral, Daily    fluticasone furoate-vilanteroL (BREO ELLIPTA) 200-25 mcg/dose DsDv diskus inhaler 1 puff, Inhalation, Daily, Controller    phenazopyridine (PYRIDIUM) 200 mg, Oral, 3 times daily PRN    pyrilamine-chlophedianoL (NINJACOF) 12.5-12.5 mg/5 mL Liqd 5 mLs, Oral, Every 6-8 hours PRN       Sil Joe, RN  [facility]  11/29/2024

## 2024-11-29 NOTE — NURSING
OBGYN LABS ENTERED INTO RESULTS CONSOLE      1st Trimester Labs Entered Into Results Console     [x] AOBRH   [x] Rubella Immune   [x] RPR   [x] HBsAg   [x] HIV   [x] Chlamydia   [x] Gonorrhea   [] Cell-Free DNA   [x] Hep-C   [] Varicella    2nd Trimester Labs Entered Into Results Console     [x]OB Glucose Screen      3rd Trimester Labs Entered Into Results Console      [x] GBS   [] RPR    Drug Screen Entered Into Results Console     [] Benzodiazes   [] Methadone   [] Cocaine (Metab)   [] Opiate   [] Amphetamine   [] Marijuana   [] Creatinine   [] Amphetamines-Beaker   [] Barbituates-Beaker   [] Benzodiazepine-Beaker   [] Cannabinoids-Beaker   [] Cocaine-Beaker   [] Fentanyl-Beaker   [] MDMA-Beaker   [] Opiates-Beaker   [] Phencyclidine-Beaker        Results Entered by Sil Joe, RN    Results Verified for Manual Entry Accuracy by Kaylan Enciso, RN

## 2024-11-29 NOTE — NURSING
[facility]  Department of OBGYN  OB Summary        PATIENT NAME: Julianna Solomon                                                                                                                                                                       AGE: 24 y.o.                                                                                          MRN: [id number]  PATIENT LOCATION:  [facility]  ADMIT DATE: 2024  TODAY'S DATE: 2024 Hospital Day: 2  MARIO: Estimated Date of Delivery: 24  GA: 38w5d     OB HISTORY:    OB History    Para Term  AB Living   3 2 2     2   SAB IAB Ectopic Multiple Live Births           2      # Outcome Date GA Lbr Karlos/2nd Weight Sex Type Anes PTL Lv   3 Current            2 Term 19 40w2d   M Vag-Spont EPI  MAHESH   1 Term 12/27/15 39w0d  3.572 kg (7 lb 14 oz) F Vag-Spont EPI N MAHESH       PRE-PROCEDURE DIAGNOSIS: Amniotic fluid leaking    PROCEDURE:   * No surgery found *       MATERNAL LABS   Lab Results   Component Value Date    GROUPTRH A POS 2024    HGB 11.7 (L) 2024    HCT 34.4 (L) 2024     2024    RUBELLAIMMUN Immune 2024    HEPBSAG Negative 2024    DOW32RJYY Negative 2024    RPR Non Reactive 2024    OBGLUCOSESCR 133 2024    STREPBCULT Positive 11/10/2024       Fentanyl, Urine   Date Value Ref Range Status   2024 Negative @FAMILIA Negative Final     Comment:     The cut-off value is 5.0 ng/mL. This is a screening test. If results   do not   correlate with clinical presentation then a confirmatory send out   test is   advised. This result is intended for use in clinical management. It   is not   intended for use in employment related testing.         Benzodiazepines   Date Value Ref Range Status   2024 Negative Negative Final     Cocaine (Metab.)   Date Value Ref Range Status   2024 Negative Negative Final     Opiate Scrn, Ur   Date Value Ref Range Status    11/28/2024 Negative Negative Final     Barbiturate Screen, Ur   Date Value Ref Range Status   11/28/2024 Negative Negative Final     Amphetamine Screen, Ur   Date Value Ref Range Status   11/28/2024 Negative Negative Final     THC   Date Value Ref Range Status   11/28/2024 Negative Negative Final     Phencyclidine   Date Value Ref Range Status   11/28/2024 Negative Negative Final     BUPRENORPHINE   Date Value Ref Range Status   11/28/2024 Negative  Final     Comment:     Buprenorphine urine screening threshold: 5 ng/mL.    This report is intended for use in clinical monitoring and management   of   patients only.          SPECIMENS  Specimen (24h ago, onward)      None             Antibiotics (From admission, onward)      None            INPATIENT MEDICATIONS  Current Facility-Administered Medications   Medication Dose Route Frequency Provider Last Rate Last Admin    acetaminophen tablet 650 mg  650 mg Oral Q6H PRJodie Mayer MD        benzocaine-lanolin (DERMOPLAST) topical spray   Topical (Top) Continuous PRJodie Mayer MD        carboprost injection 250 mcg  250 mcg Intramuscular Q15 Min Jodie Stover MD        diphenhydrAMINE capsule 25 mg  25 mg Oral Q4H PRJodie Mayer MD        diphenhydrAMINE injection 25 mg  25 mg Intravenous Q4H Jodie Stover MD        diphenoxylate-atropine 2.5-0.025 mg per tablet 2 tablet  2 tablet Oral Q6H PRJodie Mayer MD        docusate sodium capsule 200 mg  200 mg Oral BID PRJodie Mayer MD        HYDROcodone-acetaminophen  mg per tablet 1 tablet  1 tablet Oral Q4H Jodie Stover MD        HYDROcodone-acetaminophen 5-325 mg per tablet 1 tablet  1 tablet Oral Q4H Jodie Stover MD        hydrocortisone 2.5 % rectal cream   Rectal TID PRJodie Mayer MD        ibuprofen tablet 800 mg  800 mg Oral Q6H PRJodie Mayer MD        lanolin cream   Topical (Top) Jodie Stover MD        measles, mumps and  rubella vaccine 1,000-12,500 TCID50/0.5 mL injection 0.5 mL  0.5 mL Subcutaneous vaccine x 1 dose Jodie Gannon MD        methylergonovine injection 200 mcg  200 mcg Intramuscular Once PRN Jodie Gannon MD        miSOPROStoL tablet 800 mcg  800 mcg Rectal Once PRN Jodie Gannon MD        miSOPROStoL tablet 800 mcg  800 mcg Oral Once PRN Jodie Gannon MD        ondansetron disintegrating tablet 8 mg  8 mg Oral Q8H PRN Jodie Gannon MD        oxytocin 30 units/500 mL (60 milliunits/mL) in 0.9% NaCl (non-titrating)  95 albaro-units/min Intravenous Continuous PRN Jodie Gannon MD        oxytocin 30 units/500 mL (60 milliunits/mL) in 0.9% NaCl (non-titrating)  95 albaro-units/min Intravenous Once PRN Jodie Gannon MD        oxytocin 30 units/500 mL (60 milliunits/mL) in 0.9% NaCl IV bolus from bag  30 Units Intravenous Once PRN Jodie Gannon MD        oxytocin injection 10 Units  10 Units Intramuscular Once PRN Jodie Gannon MD        sodium chloride 0.9% flush 10 mL  10 mL Intravenous PRN Jodie Gannon MD        Tdap vaccine injection 0.5 mL  0.5 mL Intramuscular vaccine x 1 dose Jodie Gannon MD        tranexamic acid in NaCl,iso-os IVPB 1,000 mg  1,000 mg Intravenous Q30 Min PRN Jodie Gannon MD           OUTPATIENT MEDICATIONS  Current Outpatient Medications   Medication Instructions    albuterol (PROVENTIL/VENTOLIN HFA) 90 mcg/actuation inhaler 2 puffs, Inhalation, Every 4 hours PRN, Rescue    albuterol (PROVENTIL/VENTOLIN HFA) 90 mcg/actuation inhaler 1-2 puffs, Inhalation, Every 6 hours PRN, Rescue    azelastine (ASTELIN) 137 mcg, Nasal, 2 times daily PRN    benzocaine-menthoL (CHLORASEPTIC MAX) 15-10 mg Lozg 1 lozenge, Mucous Membrane, Every 2 hours PRN    clonazePAM (KLONOPIN) 0.5 mg, Oral, 2 times daily PRN    EPINEPHrine (EPIPEN) 0.3 mg, Intramuscular    fexofenadine (ALLEGRA) 180 mg, Oral, Daily    fluticasone furoate-vilanteroL (BREO ELLIPTA) 200-25 mcg/dose DsDv diskus  inhaler 1 puff, Inhalation, Daily, Controller    phenazopyridine (PYRIDIUM) 200 mg, Oral, 3 times daily PRN    pyrilamine-chlophedianoL (NINJACOF) 12.5-12.5 mg/5 mL Liqd 5 mLs, Oral, Every 6-8 hours PRN       VITAL SIGNS (Most Recent)  Temp: 98.2 °F (36.8 °C) (24)  Pulse: 73 (24)  Resp: 16 (24)  BP: (!) 96/59 (24)  SpO2: 98 % (24)  Temp (36hrs), Av.2 °F (36.8 °C), Min:98.1 °F (36.7 °C), Max:98.3 °F (36.8 °C)      Delivery Information for Robert Solomon    Birth information:  YOB: 2025   Time of birth: 5:45 AM   Sex: male   Head Delivery Date/Time: 2024  5:45 AM   Delivery type: Vaginal, Spontaneous   Gestational Age: 38w5d       Delivery Providers    Delivering clinician: Jodie Gannon MD   Provider Role    Sil Joe RN Registered Nurse    Jasen Leiva RN Registered Nurse    Demetria Ward, RN Registered Nurse    Gilbert Martin ST Scrub Person              Measurements    Weight:   Length:          Apgars    Living status: Living  Apgar Component Scores:  1 min.:  5 min.:  10 min.:  15 min.:  20 min.:    Skin color:  1  1       Heart rate:  2  2       Reflex irritability:  2  2       Muscle tone:  2  2       Respiratory effort:  2  2       Total:  9  9       Apgars assigned by: DEMETRIA WARD         Operative Delivery    Forceps attempted?: No  Vacuum extractor attempted?: No         Shoulder Dystocia    Shoulder dystocia present?: No           Presentation    Presentation: Vertex  Position: Left Occiput Anterior           Interventions/Resuscitation    Method: Bulb Suctioning, Tactile Stimulation       Cord    Vessels: 3 vessels  Complications: Nuchal  Nuchal Intervention: reduced  Nuchal Cord Description: loose nuchal cord  Number of Loops: 2  Delayed Cord Clamping?: Yes  Cord Clamped Date/Time: 2024  5:47 AM  Cord Blood Disposition: Sent with Baby  Gases Sent?: No  Stem Cell Collection (by MD): No       Placenta     Placenta delivery date/time: 2024 0551  Placenta removal: Spontaneous  Placenta appearance: Intact  Placenta disposition: Discarded           Labor Events:       labor: No     Labor Onset Date/Time: 2024 01:58     Dilation Complete Date/Time: 2024 05:37     Start Pushing Date/Time: 2024 05:37     Rupture Date/Time: 24        Rupture type: SRM (Spontaneous Rupture)        Fluid Amount:       Fluid Color: Clear      steroids: None     Antibiotics given for GBS: Yes     Induction: none     Indications for induction:        Augmentation: oxytocin     Indications for augmentation: Ineffective Contraction Pattern     Labor complications: None     Additional complications:          Cervical ripening:                     Delivery:      Episiotomy: None     Indication for Episiotomy:       Perineal Lacerations: None Repaired:      Periurethral Laceration:   Repaired:     Labial Laceration:   Repaired:     Sulcus Laceration:   Repaired:     Vaginal Laceration:   Repaired:     Cervical Laceration:   Repaired:     Repair suture: None     Repair # of packets:       Last Value - EBL - Nursing (mL):       Sum - EBL - Nursing (mL): 0     Last Value - EBL - Anesthesia (mL):      Calculated QBL (mL): 15     Running total QBL (mL): 15     Vaginal Sweep Performed: Yes     Surgicount Correct: Yes       Other providers:       Anesthesia    Method: Epidural          Details (if applicable):  Trial of Labor      Categorization:      Priority:     Indications for :     Incision Type:       Additional  information:  Forceps:    Vacuum:    Breech:    Observed anomalies    Other (Comments):           Time ruptured: 9h 22m    SKIN TO SKIN                INFANT FEEDING       PAST MEDICAL HISTORY   Past Medical History:   Diagnosis Date    Asthma         PAST SURGICAL HISTORY    History reviewed. No pertinent surgical history.     SOCIAL HISTORY    Social  History     Tobacco Use    Smoking status: Former     Types: Cigarettes     Passive exposure: Past    Smokeless tobacco: Never   Substance Use Topics    Alcohol use: Not Currently    Drug use: Never                     Sil Joe RN  Date of Service: 11/29/2024  6:51 AM

## 2024-11-29 NOTE — HOSPITAL COURSE
at 38.4w admitted with SROM clear.  +GBS, PCN allergic, no sensitivities done, will trial [facility] GBS protocol.       of vigorous male OA over intact perineum.  Epidural.   EBL=100cc

## 2024-11-29 NOTE — ANESTHESIA PROCEDURE NOTES
Epidural    Patient location during procedure: OB   Reason for block: primary anesthetic   Reason for block: labor analgesia requested by patient and obstetrician  Diagnosis: IUP    Start time: 11/29/2024 1:20 AM  Timeout: 11/29/2024 1:20 AM  End time: 11/29/2024 1:31 AM    Staffing  Performing Provider: Gamaliel Gonzales MD  Authorizing Provider: Gamaliel Gonzales MD    Staffing  Performed by: Gamaliel Gonzales MD  Authorized by: Gamaliel Gonzales MD        Preanesthetic Checklist  Completed: patient identified, IV checked, site marked, risks and benefits discussed, surgical consent, monitors and equipment checked, pre-op evaluation, timeout performed, anesthesia consent given, hand hygiene performed and patient being monitored  Preparation  Patient position: sitting  Prep: Betadine  Patient monitoring: ECG, Pulse Ox and Blood Pressure  Reason for block: primary anesthetic   Epidural  Skin Anesthetic: lidocaine 1%  Administration type: continuous  Approach: midline  Interspace: L3-4    Injection technique: VINI air  Needle and Epidural Catheter  Needle type: Tuohy   Needle gauge: 17  Needle length: 3.5 inches  Catheter type: springwound  Catheter size: 19 G  Insertion Attempts: 1  Test dose: 3 mL of lidocaine 1.5% with Epi 1-to-200,000  Additional Documentation: incremental injection, negative aspiration for heme and CSF, no paresthesia on injection, no signs/symptoms of IV or SA injection, no significant pain on injection and no significant complaints from patient  Needle localization: anatomical landmarks  Assessment  Ease of block: easy  Patient's tolerance of the procedure: comfortable throughout block and no complaints  Additional Notes    LE  PCEA No inadvertent dural puncture with Tuohy.  Dural puncture not performed with spinal needle

## 2024-11-29 NOTE — ANESTHESIA PREPROCEDURE EVALUATION
"                                                                                                             11/29/2024  Julianna Solomon is a 24 y.o., female.      There is no problem list on file for this patient.      History reviewed. No pertinent surgical history.     Tobacco Use:  The patient  reports that she has quit smoking. Her smoking use included cigarettes. She has been exposed to tobacco smoke. She has never used smokeless tobacco.     No results found for this or any previous visit.          Lab Results   Component Value Date    WBC 9.94 11/28/2024    HGB 11.7 (L) 11/28/2024    HCT 34.4 (L) 11/28/2024    MCV 91 11/28/2024     11/28/2024     BMP  No results found for: "NA", "K", "CL", "CO2", "BUN", "CREATININE", "CALCIUM", "ANIONGAP", "GLU"    No results found for this or any previous visit.              Pre-op Assessment    I have reviewed the Patient Summary Reports.     I have reviewed the Nursing Notes. I have reviewed the NPO Status.   I have reviewed the Medications.     Review of Systems  Anesthesia Hx:  No problems with previous Anesthesia             Denies Family Hx of Anesthesia complications.    Denies Personal Hx of Anesthesia complications.                    Social:  Former Smoker       Hematology/Oncology:  Hematology Normal                                     Cardiovascular:  Cardiovascular Normal                                              Pulmonary:    Asthma mild                   Hepatic/GI:  Hepatic/GI Normal                    Musculoskeletal:  Musculoskeletal Normal                Neurological:  Neurology Normal                                      Endocrine:  Endocrine Normal                Physical Exam  General: Well nourished, Cooperative, Alert and Oriented    Airway:  Mallampati: III / II  Mouth Opening: Normal  TM Distance: Normal  Tongue: Normal  Neck ROM: Normal ROM    Dental:  Intact    Chest/Lungs:  Clear to auscultation    Heart:  Rate: Normal  Rhythm: Regular " Rhythm  Sounds: Normal    Abdomen:  Normal, Soft, Nontender        Anesthesia Plan  Type of Anesthesia, risks & benefits discussed:    Anesthesia Type: Epidural  Intra-op Monitoring Plan: Standard ASA Monitors  Post Op Pain Control Plan: epidural analgesia  Informed Consent: Informed consent signed with the Patient and all parties understand the risks and agree with anesthesia plan.  All questions answered.   ASA Score: 2 Emergent    Ready For Surgery From Anesthesia Perspective.     .

## 2024-11-29 NOTE — PLAN OF CARE
[facility]  Discharge Assessment    Primary Care Provider: No, Primary Doctor       OB Screen completed using health record.  No needs anticipated at this time, and no consult(s) noted.    OB Screen (most recent)       OB Screen - [id number]          OB SCREEN    Assessment Type Discharge Planning Assessment     Source of Information health record     Received Prenatal Care Yes     Any indications/suspicions for None     Is this a teen pregnancy No     Is the baby in NICU No     Indication for adoption/Safe Haven No     Indication for DME/post-acute needs No     HIV (+) No     Any congenital  disorders No     Fetal demise/ death No

## 2024-11-29 NOTE — CARE UPDATE
11/29/24 1415   Patient Assessment/Suction   Level of Consciousness (AVPU) alert   Respiratory Effort Unlabored   Expansion/Accessory Muscles/Retractions no use of accessory muscles   All Lung Fields Breath Sounds clear   Rhythm/Pattern, Respiratory depth regular;pattern regular;unlabored   Cough Frequency no cough   PRE-TX-O2   Device (Oxygen Therapy) room air   SpO2 98 %   Pulse Oximetry Type Intermittent   $ Pulse Oximetry - Multiple Charge Pulse Oximetry - Multiple   Pulse 76   Resp 15   Aerosol Therapy   $ Aerosol Therapy Charges PRN treatment not required   Education   $ Education Bronchodilator;15 min   Tobacco Cessation Intervention   Do you use any type of tobacco product? No   Respiratory Evaluation   $ Care Plan Tech Time 15 min   $ Respiratory Evaluation Complete   Evaluation For New Orders   Admitting Diagnosis amniotic fluid leaking   Cardiac Diagnosis NA   Pulmonary Diagnosis asthma   Current Surgeries NA   Home Oxygen   Has Home Oxygen? No   Home Aerosol, MDI, DPI, and Other Treatments/Therapies   Home Respiratory Therapy Per Patient/Review of Chart Yes   MDI Home Meds/Freq albuterol inhaler prn   Oxygen Care Plan   Rationale No rational found   Bronchodilator Care Plan   Aerosol Meds w/ frequency   (udv Q6prn)   Atelectasis Care Plan   Rationale No Rational Found   Airway Clearance Care Plan   Rationale No rationale found

## 2024-11-30 VITALS
WEIGHT: 162 LBS | BODY MASS INDEX: 24.55 KG/M2 | SYSTOLIC BLOOD PRESSURE: 104 MMHG | DIASTOLIC BLOOD PRESSURE: 68 MMHG | RESPIRATION RATE: 18 BRPM | TEMPERATURE: 98 F | HEIGHT: 68 IN | OXYGEN SATURATION: 97 % | HEART RATE: 86 BPM

## 2024-11-30 LAB
BASOPHILS # BLD AUTO: 0.03 K/UL (ref 0–0.2)
BASOPHILS NFR BLD: 0.3 % (ref 0–1.9)
DIFFERENTIAL METHOD BLD: ABNORMAL
EOSINOPHIL # BLD AUTO: 0.1 K/UL (ref 0–0.5)
EOSINOPHIL NFR BLD: 1 % (ref 0–8)
ERYTHROCYTE [DISTWIDTH] IN BLOOD BY AUTOMATED COUNT: 15.6 % (ref 11.5–14.5)
HCT VFR BLD AUTO: 34.7 % (ref 37–48.5)
HGB BLD-MCNC: 11.5 G/DL (ref 12–16)
IMM GRANULOCYTES # BLD AUTO: 0.06 K/UL (ref 0–0.04)
IMM GRANULOCYTES NFR BLD AUTO: 0.5 % (ref 0–0.5)
LYMPHOCYTES # BLD AUTO: 2.6 K/UL (ref 1–4.8)
LYMPHOCYTES NFR BLD: 22.2 % (ref 18–48)
MCH RBC QN AUTO: 30.3 PG (ref 27–31)
MCHC RBC AUTO-ENTMCNC: 33.1 G/DL (ref 32–36)
MCV RBC AUTO: 92 FL (ref 82–98)
MONOCYTES # BLD AUTO: 1.2 K/UL (ref 0.3–1)
MONOCYTES NFR BLD: 10.5 % (ref 4–15)
NEUTROPHILS # BLD AUTO: 7.6 K/UL (ref 1.8–7.7)
NEUTROPHILS NFR BLD: 65.5 % (ref 38–73)
NRBC BLD-RTO: 0 /100 WBC
PLATELET # BLD AUTO: 154 K/UL (ref 150–450)
PMV BLD AUTO: 9.9 FL (ref 9.2–12.9)
RBC # BLD AUTO: 3.79 M/UL (ref 4–5.4)
WBC # BLD AUTO: 11.66 K/UL (ref 3.9–12.7)

## 2024-11-30 PROCEDURE — 99900031 HC PATIENT EDUCATION (STAT)

## 2024-11-30 PROCEDURE — 85025 COMPLETE CBC W/AUTO DIFF WBC: CPT | Performed by: OBSTETRICS & GYNECOLOGY

## 2024-11-30 PROCEDURE — 36415 COLL VENOUS BLD VENIPUNCTURE: CPT | Performed by: OBSTETRICS & GYNECOLOGY

## 2024-11-30 PROCEDURE — 25000003 PHARM REV CODE 250: Performed by: OBSTETRICS & GYNECOLOGY

## 2024-11-30 PROCEDURE — 99024 POSTOP FOLLOW-UP VISIT: CPT | Mod: ,,, | Performed by: OBSTETRICS & GYNECOLOGY

## 2024-11-30 PROCEDURE — 99900035 HC TECH TIME PER 15 MIN (STAT)

## 2024-11-30 PROCEDURE — 94761 N-INVAS EAR/PLS OXIMETRY MLT: CPT

## 2024-11-30 RX ORDER — OXYCODONE AND ACETAMINOPHEN 5; 325 MG/1; MG/1
1 TABLET ORAL EVERY 4 HOURS PRN
Qty: 12 TABLET | Refills: 0 | Status: SHIPPED | OUTPATIENT
Start: 2024-11-30

## 2024-11-30 RX ORDER — IBUPROFEN 600 MG/1
600 TABLET ORAL EVERY 6 HOURS PRN
Qty: 40 TABLET | Refills: 1 | Status: SHIPPED | OUTPATIENT
Start: 2024-11-30

## 2024-11-30 RX ADMIN — IBUPROFEN 800 MG: 400 TABLET, FILM COATED ORAL at 05:11

## 2024-11-30 RX ADMIN — HYDROCODONE BITARTRATE AND ACETAMINOPHEN 1 TABLET: 5; 325 TABLET ORAL at 06:11

## 2024-11-30 RX ADMIN — IBUPROFEN 800 MG: 400 TABLET, FILM COATED ORAL at 10:11

## 2024-11-30 RX ADMIN — HYDROCODONE BITARTRATE AND ACETAMINOPHEN 1 TABLET: 5; 325 TABLET ORAL at 10:11

## 2024-11-30 NOTE — DISCHARGE SUMMARY
[facility]  Department of Obstetrics & Gynecology  Discharge Summary    Admit Date: 11/28/2024    Today's Date: 11/30/2024    Hospital: [facility]    Delivery Physician: Dr Gannon    Procedures Performed:        SUMMARY      Normal spontaneous vaginal delivery of live infant, was placed on mothers abdomen for skin to skin and bulb suctioning performed.  Infant delivered position OA over intact perineum.  Nuchal cord: Yes, cord reduced at perineum.x2     Spontaneous delivery of placenta and IV pitocin given noting good uterine tone.  No lacerations noted.  Patient tolerated delivery well. Sponge needle and lap counted correctly x2.  EBL=100cc     Indications: Amniotic fluid leaking     Admitting GA: 38w5d     Delivery Information for Robert Solomon     Birth information:  YOB: 2025   Time of birth: 5:45 AM   Sex: male   Head Delivery Date/Time:     Delivery type:     Gestational Age: 38w5d     Hospital Course:      The patient is status post vaginal delivery as above    The patient currently reports no significant postpartum issues or problems.  Good pain control on oral medications.    Ambulating without difficulty.   Tolerating regular diet.      Planning to Breast Feed.  No reported breast issues.    Vital signs reviewed.  BP: 104/68    Vitals:    11/30/24 0735   BP: 104/68   Pulse: 86   Resp: 18   Temp: 97.5 °F (36.4 °C)       Labs:    Recent Results (from the past 2 weeks)   CBC auto differential    Collection Time: 11/30/24  4:15 AM   Result Value Ref Range    WBC 11.66 3.90 - 12.70 K/uL    Hemoglobin 11.5 (L) 12.0 - 16.0 g/dL    Hematocrit 34.7 (L) 37.0 - 48.5 %    Platelets 154 150 - 450 K/uL   CBC with Auto Differential    Collection Time: 11/28/24 10:16 PM   Result Value Ref Range    WBC 9.94 3.90 - 12.70 K/uL    Hemoglobin 11.7 (L) 12.0 - 16.0 g/dL    Hematocrit 34.4 (L) 37.0 - 48.5 %    Platelets 173 150 - 450 K/uL        No pulmonary or cardiac issues  noted.  Heart: Regular rate and rhythm  Lungs:  No abnormal pulmonary effort.  Clear to auscultation.    Abdomen is soft with firm fundus and appropriate tenderness.     Discharge Diet: Regular     Discharge Activity: Pelvic rest x 6 weeks, light activity x 2 weeks.     Discharge Medications:      Medication List        START taking these medications      ibuprofen 600 MG tablet  Commonly known as: ADVIL,MOTRIN  Take 1 tablet (600 mg total) by mouth every 6 (six) hours as needed for Pain.     oxyCODONE-acetaminophen 5-325 mg per tablet  Commonly known as: PERCOCET  Take 1 tablet by mouth every 4 (four) hours as needed for Pain (Diagnosis:  Acute postpartum pain management).            CONTINUE taking these medications      EPINEPHrine 0.3 mg/0.3 mL Atin  Commonly known as: EPIPEN            STOP taking these medications      albuterol 90 mcg/actuation inhaler  Commonly known as: PROVENTIL/VENTOLIN HFA     azelastine 137 mcg (0.1 %) nasal spray  Commonly known as: ASTELIN     CHLORASEPTIC MAX 15-10 mg Lozg  Generic drug: benzocaine-menthoL     fexofenadine 180 MG tablet  Commonly known as: ALLEGRA     fluticasone furoate-vilanteroL 200-25 mcg/dose Dsdv diskus inhaler  Commonly known as: BREO ELLIPTA     NINJACOF 12.5-12.5 mg/5 mL Liqd  Generic drug: pyrilamine-chlophedianoL     phenazopyridine 200 MG tablet  Commonly known as: PYRIDIUM            ASK your doctor about these medications      clonazePAM 0.5 MG disintegrating tablet  Commonly known as: KlonoPIN               Where to Get Your Medications        These medications were sent to [facility]/pharmacy #[id number] - [place], [place] - [address] AT [facility]  [address], [place] [address]      Phone: [phone]   ibuprofen 600 MG tablet  oxyCODONE-acetaminophen 5-325 mg per tablet          Discharge Follow-Up: 6 Weeks or as needed.    Condition on Discharge: stable    Discharge Diagnosis:   S/P:  Vaginal Delivery    Precautions and instructions  discussed prior to discarge, call with any postoperative/postpartum issues.  Discharge medications reviewed discussed.  Discharge pending evaluation of the infant by pediatric    The above was reviewed and discussed with the patient.    The patient's questions regarding discharge were answered and she is in agreement with the discharge plan.    Laci Carrion MD  Department OBGYN  [facility]

## 2024-11-30 NOTE — CARE UPDATE
11/30/24 0727   Patient Assessment/Suction   Level of Consciousness (AVPU) alert   Respiratory Effort Unlabored   Expansion/Accessory Muscles/Retractions no use of accessory muscles   All Lung Fields Breath Sounds clear   Rhythm/Pattern, Respiratory depth regular;pattern regular;unlabored   Cough Frequency no cough   PRE-TX-O2   Device (Oxygen Therapy) room air   SpO2 97 %   Pulse Oximetry Type Intermittent   $ Pulse Oximetry - Multiple Charge Pulse Oximetry - Multiple   Pulse 96   Resp 15   Aerosol Therapy   $ Aerosol Therapy Charges PRN treatment not required   Education   $ Education Bronchodilator;15 min   Respiratory Evaluation   $ Care Plan Tech Time 15 min

## 2024-11-30 NOTE — PLAN OF CARE
11/30/24 1108   Final Note   Assessment Type Final Discharge Note   Anticipated Discharge Disposition Home   Post-Acute Status   Discharge Delays None known at this time

## 2024-11-30 NOTE — NURSING
Vaginal delivery discharge instructions given to Julianna, verbalizes understanding. Questions answered, no further questions. Prescriptions sent to [facility] in [place]. [place]

## 2024-11-30 NOTE — CARE UPDATE
11/29/24 1947   Patient Assessment/Suction   Level of Consciousness (AVPU) alert   Respiratory Effort Normal;Unlabored   Expansion/Accessory Muscles/Retractions no use of accessory muscles;no retractions   All Lung Fields Breath Sounds clear   Rhythm/Pattern, Respiratory unlabored;pattern regular;no shortness of breath reported   Cough Frequency no cough   PRE-TX-O2   Device (Oxygen Therapy) room air   SpO2 97 %   Pulse Oximetry Type Intermittent   Pulse 80   Resp 18   Aerosol Therapy   $ Aerosol Therapy Charges PRN treatment not required   Daily Review of Necessity (SVN) completed   Respiratory Treatment Status (SVN) PRN treatment not required

## 2024-11-30 NOTE — PLAN OF CARE
VSS  Fundus firm and midline, below umbilicus with light lochia  Pain controlled with motrin and percocet as needed  Julianna is ambulating independently, voiding and tolerating a regular diet  She is breastfeeding and bonding with Severo  She is happy to be discharged today whenever Severo is discharged by ped

## 2024-11-30 NOTE — DISCHARGE INSTRUCTIONS
Pelvic rest for 6 weeks (no sex, tampons, douching, nothing in the vagina)    You can experience vaginal bleeding on and off for up to 6 weeks, it will gradually get lighter and the color will change from bright red to a brownish discharge towards the end.    Activity:  NO strenuous activities or exercising.  Do not /lift anything over 15 pounds.   Do not do heavy housework or cleaning.    NO driving for 3 days.  You may take short car trips but do not drive.    You may shower and/or soak in a bathtub, both are acceptable.  Use a mild soap, no heavy perfumes or fragrances to avoid irritation.     If constipation develops:  You may take Colace (stool softener), Milk of Magnesia, Dulcolax or Miralax.  All of these medications are sold over the counter.      Pain Relief:  You may take Motrin for mild pain & uterine cramping.      Emotional Changes:  You may experience baby blues after delivery.  You may feel let down, anxious and cry easily.  This is normal.  These feelings can begin 2-3 days after delivery and usually disappear in about a week or two.  Prolonged sadness may indicate postpartum depression.      Call your doctor for any of the following:  Difficulty breathing, problems with any of your medications, inability to eat.    Foul smelling vaginal discharge.  Temperature above 100.4.    Heavy vaginal bleeding.  All women bleed different after delivery and each delivery is different.  Heavy bleeding consists of saturating a clemente pad in a 1 hour time period.  Passing clots are normal, if you pass a blood clot larger than the size of a golf ball call your doctor's office.   If you experience pain in your legs/calves, if one leg increases in size and becomes swollen or becomes hot to touch or discolored.   Crying or periods of sadness beyond 2 weeks.      If you are breast feeding:  Wash your breasts with mild soap and warm water.  You should wear a supportive bra.  You should continue to take a prenatal  vitamin for 6 weeks or until breastfeeding is discontinued.  If nipples are sore, apply a few drops of breast milk after nursing and let air dry or you can use Lanolin cream.   If breasts are engorged, apply warmth and express milk.

## 2024-11-30 NOTE — ANESTHESIA POSTPROCEDURE EVALUATION
Anesthesia Post Evaluation    Patient: Julianna Solomon    Procedure(s) Performed: * No procedures listed *    Final Anesthesia Type: epidural      Patient location during evaluation: floor  Patient participation: Yes- Able to Participate  Level of consciousness: awake and alert  Post-procedure vital signs: reviewed and stable  Pain management: adequate  Airway patency: patent    PONV status at discharge: No PONV  Anesthetic complications: no      Cardiovascular status: blood pressure returned to baseline  Respiratory status: unassisted  Hydration status: euvolemic  Follow-up not needed.              Vitals Value Taken Time   /68 11/30/24 0735   Temp 36.4 °C (97.5 °F) 11/30/24 0735   Pulse 86 11/30/24 0735   Resp 18 11/30/24 1027   SpO2 97 % 11/30/24 0735         No case tracking events are documented in the log.      Pain/Chapo Score: Pain Rating Prior to Med Admin: 5 (11/30/2024 10:27 AM)  Pain Rating Post Med Admin: 0 (11/30/2024  7:30 AM)

## 2025-05-08 DIAGNOSIS — R10.2 PELVIC PAIN SYNDROME: Primary | ICD-10-CM

## 2025-06-03 ENCOUNTER — OFFICE VISIT (OUTPATIENT)
Dept: URGENT CARE | Facility: CLINIC | Age: 25
End: 2025-06-03
Payer: MEDICAID

## 2025-06-03 VITALS
DIASTOLIC BLOOD PRESSURE: 70 MMHG | TEMPERATURE: 98 F | HEIGHT: 68 IN | OXYGEN SATURATION: 99 % | BODY MASS INDEX: 20.46 KG/M2 | WEIGHT: 135 LBS | SYSTOLIC BLOOD PRESSURE: 103 MMHG | HEART RATE: 78 BPM | RESPIRATION RATE: 20 BRPM

## 2025-06-03 DIAGNOSIS — R09.81 SINUS CONGESTION: ICD-10-CM

## 2025-06-03 DIAGNOSIS — R06.2 WHEEZING: Primary | ICD-10-CM

## 2025-06-03 DIAGNOSIS — J06.9 VIRAL URI WITH COUGH: ICD-10-CM

## 2025-06-03 PROCEDURE — 99214 OFFICE O/P EST MOD 30 MIN: CPT | Mod: S$GLB,,, | Performed by: NURSE PRACTITIONER

## 2025-06-03 RX ORDER — CETIRIZINE HYDROCHLORIDE 10 MG/1
10 TABLET ORAL DAILY
Qty: 30 TABLET | Refills: 0 | Status: SHIPPED | OUTPATIENT
Start: 2025-06-03

## 2025-06-03 RX ORDER — PROMETHAZINE HYDROCHLORIDE AND DEXTROMETHORPHAN HYDROBROMIDE 6.25; 15 MG/5ML; MG/5ML
5 SYRUP ORAL NIGHTLY PRN
Qty: 50 ML | Refills: 0 | Status: SHIPPED | OUTPATIENT
Start: 2025-06-03 | End: 2025-06-13